# Patient Record
Sex: FEMALE | Race: BLACK OR AFRICAN AMERICAN | Employment: UNEMPLOYED | URBAN - METROPOLITAN AREA
[De-identification: names, ages, dates, MRNs, and addresses within clinical notes are randomized per-mention and may not be internally consistent; named-entity substitution may affect disease eponyms.]

---

## 2018-10-03 LAB
ANTIBODY SCREEN, EXTERNAL: NEGATIVE
CHLAMYDIA, EXTERNAL: NEGATIVE
HBSAG, EXTERNAL: NEGATIVE
HIV, EXTERNAL: NON REACTIVE
N. GONORRHEA, EXTERNAL: NEGATIVE
RPR, EXTERNAL: NON REACTIVE
TYPE, ABO & RH, EXTERNAL: NORMAL

## 2019-02-05 LAB — GRBS, EXTERNAL: NEGATIVE

## 2019-02-16 ENCOUNTER — HOSPITAL ENCOUNTER (INPATIENT)
Age: 24
LOS: 1 days | Discharge: HOME | DRG: 566 | End: 2019-02-17
Attending: OBSTETRICS & GYNECOLOGY | Admitting: OBSTETRICS & GYNECOLOGY
Payer: COMMERCIAL

## 2019-02-16 PROCEDURE — 75810000275 HC EMERGENCY DEPT VISIT NO LEVEL OF CARE

## 2019-02-17 VITALS
HEART RATE: 86 BPM | SYSTOLIC BLOOD PRESSURE: 141 MMHG | WEIGHT: 234.79 LBS | HEIGHT: 67 IN | TEMPERATURE: 98.4 F | OXYGEN SATURATION: 99 % | RESPIRATION RATE: 17 BRPM | BODY MASS INDEX: 36.85 KG/M2 | DIASTOLIC BLOOD PRESSURE: 78 MMHG

## 2019-02-17 PROBLEM — O36.4XX0 IUFD AT 20 WEEKS OR MORE OF GESTATION: Status: ACTIVE | Noted: 2019-02-17

## 2019-02-17 PROCEDURE — 65410000002 HC RM PRIVATE OB

## 2019-02-17 PROCEDURE — 99283 EMERGENCY DEPT VISIT LOW MDM: CPT

## 2019-02-17 RX ORDER — SODIUM CHLORIDE, SODIUM LACTATE, POTASSIUM CHLORIDE, CALCIUM CHLORIDE 600; 310; 30; 20 MG/100ML; MG/100ML; MG/100ML; MG/100ML
125 INJECTION, SOLUTION INTRAVENOUS CONTINUOUS
Status: DISCONTINUED | OUTPATIENT
Start: 2019-02-17 | End: 2019-02-17 | Stop reason: HOSPADM

## 2019-02-17 RX ORDER — LANOLIN ALCOHOL/MO/W.PET/CERES
CREAM (GRAM) TOPICAL
COMMUNITY
End: 2020-06-23 | Stop reason: ALTCHOICE

## 2019-02-17 RX ORDER — SODIUM CHLORIDE 0.9 % (FLUSH) 0.9 %
5-40 SYRINGE (ML) INJECTION EVERY 8 HOURS
Status: DISCONTINUED | OUTPATIENT
Start: 2019-02-17 | End: 2019-02-17 | Stop reason: HOSPADM

## 2019-02-17 RX ORDER — NALOXONE HYDROCHLORIDE 0.4 MG/ML
0.4 INJECTION, SOLUTION INTRAMUSCULAR; INTRAVENOUS; SUBCUTANEOUS AS NEEDED
Status: DISCONTINUED | OUTPATIENT
Start: 2019-02-17 | End: 2019-02-17 | Stop reason: HOSPADM

## 2019-02-17 RX ORDER — ONDANSETRON 2 MG/ML
4 INJECTION INTRAMUSCULAR; INTRAVENOUS
Status: DISCONTINUED | OUTPATIENT
Start: 2019-02-17 | End: 2019-02-17 | Stop reason: HOSPADM

## 2019-02-17 RX ORDER — SODIUM CHLORIDE 0.9 % (FLUSH) 0.9 %
5-40 SYRINGE (ML) INJECTION AS NEEDED
Status: DISCONTINUED | OUTPATIENT
Start: 2019-02-17 | End: 2019-02-17 | Stop reason: HOSPADM

## 2019-02-17 RX ORDER — NALBUPHINE HYDROCHLORIDE 10 MG/ML
10 INJECTION, SOLUTION INTRAMUSCULAR; INTRAVENOUS; SUBCUTANEOUS
Status: DISCONTINUED | OUTPATIENT
Start: 2019-02-17 | End: 2019-02-17 | Stop reason: HOSPADM

## 2019-02-17 NOTE — PROGRESS NOTES
2354: Pt. Presents to L & D for c/o decreased FM x 2 days. Per pt. She thinks she felt baby move some earlier today but it was not kicks or any movement she is used to. Pt. Is a  last delivery was  and was uncomplicated . Pt. Denies LOF, Vag bleeding, HA, N/V, Dizziness or blurred vision. Pt. Is scheduled for elective induction of labor this Thursday evening. Pt. Followed by Dr. Violet Albrecht and was last seen on . EFM attempted. Unable to obtain FHR. Second RN to bedside also attempting to locate FHR. Maternal position with continued difficulty to obtain FHR. Notified Dr. Pasquale Hidalgo and request for bedside ultrasound. 0002: Dr. Pasquale Hidalgo to bedside. Bedside Ultrasound performed. No FHR detected. IUFD confirmed. Pt. And S.o. Emotional. Emotional support provided. Tissues provided. Pt. Calling mother to tell her what has happened and ask her to come. 0004: RN's remain at bedside offering condolences and support. 0010: Stepped out of triage room and into room adjacent to provide privacy but still also be close for support. 0024: Dr. Pasquale Hidalgo back in room with patient and S.O. Talking again with patient about findings and about the loss of their baby and the plan of care. Emotional support continuing to be provided. Pt. Declines any intervention at this time and is requesting some alone time with S.O. Notified them both how to reach me and that I was just outside the door should they need me. 0040: Checked back in with patient and S.O. Both in the bed still very emotional. Asked if they had any questions or needed anything at this time. Pt. Awaiting her mothers arrival. Updated Dr. Pasquale Hidalgo. Will notify MD once patient's mother arrives and when patient is ready to discuss possible POC.     0118: Moved pt. To room 3157 for more comfort and room for when her family arrives. Offered support and asked if she was ready for me to possibly assess her vital signs or perhaps start her IV and she declined. Instructed pt. Again how to use the call bell if she needed me and will check back in shortly. 0215: Pt. Kaleb Rosales in bed.  at bedside with patient. 's mother and father arrived. 0240: Additional family arrived. In to speak with family with patient's consent. Pt. And family asking about possibly going home vs. Induction of labor. Discussed that Dr. Shantal Jackson would need to come assess her cervix and decide on POC. Discussing things as a family. Emotional support provided. Will check back in.     0330: Pt. Desires to go home. She wants to speak with Dr. Galo Curry and plan for delivery after speaking with him. VS obtained. Called and spoke with Dr. Shantal Jackson. Pt. To be discharged home and POC to be deferred to primary MD. Pt. Afebrile. Family and patient all agreeable with this plan. 5975: Discharge instructions explained and given. S/S's to report explained to both patient and family members. Emotional support provided again. Phone number to contact L & D given as well as two phone numbers obtained from patient for Dr. Galo Curry to reach out later today or myself once I have spoken with Dr. Galo Curry. 0400: Walked with pt. And S.O. Off of unit to elevator. Pt. Ambulating without difficulty. All belongings removed from room and with patient. Pt. Very appreciative of the care received. Emotional support provided once again.

## 2019-02-17 NOTE — DISCHARGE INSTRUCTIONS
Patient Education        Stillbirth (Before Delivery): Care Instructions  Your Care Instructions    Stillbirth is the loss of a baby after 20 weeks of pregnancy. When a baby dies while still in the womb, this may also be called fetal loss. A doctor may deliver the baby by giving you medicine to start labor. Or you may have a surgical procedure called D&E (dilation and evacuation). The loss of a baby is devastating and very hard to accept. You may wonder why it happened or blame yourself. But fetal loss can happen even during a pregnancy that has been going well. In the weeks to come, try to take care of yourself physically and emotionally. Take care of yourself in whatever way feels best.  Follow-up care is a key part of your treatment and safety. Be sure to make and go to all appointments, and call your doctor if you are having problems. It's also a good idea to know your test results and keep a list of the medicines you take. What happens next? After a fetus dies, labor will usually begin on its own within 2 weeks. Many women don't want to wait that long. They choose to have labor induced. This means going to the hospital and, usually, getting medicine that starts the labor process. If labor doesn't start on its own, your doctor may take steps to get your labor going. · Your doctor may use medicine to soften your cervix and help it begin to open. · Then your doctor probably will give you medicine to start labor and keep your labor going. · You will be given medicine for pain if you need it. After delivery, you will probably be able to see the baby if you want to. Although this can be very hard, some parents want the chance to hold the baby and say goodbye. You will probably go home the next day. Surgery instead of labor  Some women may be able to choose surgery (D&E) instead of going through labor. Your doctor will discuss whether this is an option for you.   Delivery by  section is rare in fetal loss. It is major surgery, so it's only done when going through labor would be more dangerous. Deciding about autopsy  If the exact cause of death isn't known, you may face a decision about whether to have an autopsy. This can be a hard decision. But an autopsy may help you find out why this terrible loss happened to you and whether it could happen again. How can you care for yourself at home? After the delivery, there are things you can do for your physical health and comfort. Taking care of your body  · Use pads instead of tampons for the bloody flow that may last as long as 2 weeks. · Ask your doctor if you can take an over-the-counter pain medicine, such as acetaminophen (Tylenol), ibuprofen (Advil, Motrin), or naproxen (Aleve), to ease cramps. Be safe with medicines. Read and follow all instructions on the label. · Ask your doctor about when it is okay to have sex. Many doctors recommend waiting about 4 to 6 weeks. This gives your body time to heal.  · If your milk has started to come in, talk to your doctor about how to ease discomfort. Dealing with your grief  · Rest whenever you can. Being tired makes it harder to handle your emotions. · Tell your family and friends what they can do. You may want to spend time alone, or you may seek the comfort of family and friends. · Try to eat healthy foods, get some sleep, and get exercise (or just get out of the house) to help you feel strong as you heal.  · Talk to your doctor about how you are coping. He or she will want to watch you for signs of depression. You may want to have counseling for support and to help you express your feelings. · Think about making a memory book of your pregnancy and baby. Many parents name their baby and want to take pictures and keep a lock of hair. The hospital may take photos or footprints for you. Some parents have a ceremony, such as a christening or other blessing or a  service.   · If you can, try to talk to others who have gone through this loss. You can make connections online or in person. Here are some organizations that can help:  ? The [employer]. This is a resource for people who have lost a child. The group can help put you in touch with one of its support groups in your area. The website is www.compassionatefriends. org.  ? Share (Pregnancy and Infant Loss [address] [place] [address].). This group can offer advice and connections to others who have lost a child. The group's website is www.[facility]ient [facility].org.  ? The [employer] [facility] [employer]. This group offers information and resources. The website is www.stillbirthalliance.org. When should you call for help? Call 911 anytime you think you may need emergency care. For example, call if:    · You passed out (lost consciousness).     · You have severe vaginal bleeding.     · You have severe pain in your belly or pelvis.    Call your doctor now or seek immediate medical care if:    · You have signs of preeclampsia, such as:  ? Sudden swelling of your face, hands, or feet. ? New vision problems (such as dimness or blurring). ? A severe headache.     · You have a fever.     · You have belly pain or cramping.     · You have any vaginal bleeding.     · You have had regular contractions (with or without pain) for an hour. This means that you have 8 or more within 1 hour or 4 or more in 20 minutes after you change your position and drink fluids.     · You have a sudden release of fluid from your vagina.     · You have low back pain or pelvic pressure that does not go away.    Watch closely for changes in your health, and be sure to contact your doctor if you have any problems. Where can you learn more? Go to http://isak-joe.info/. Enter Q720 in the search box to learn more about \"Stillbirth (Before Delivery): Care Instructions. \"  Current as of: September 5, 2018  Content Version: 11.9  © 6402-0402 [employer], [facility].  Care instructions adapted under license by [employer] (which disclaims liability or warranty for this information). If you have questions about a medical condition or this instruction, always ask your healthcare professional. [place] [address] any warranty or liability for your use of this information. RN to notify Dr. Josette Lees later this morning 2/17/19. Will discuss with him plan of care and delivery plan. Expect a phone call from him and a plan of care.

## 2019-02-17 NOTE — H&P
History & Physical    Name: Izzy Toure MRN: [id number]  SSN: [id number]    YOB: 1995  Age: 24 y.o. Sex: female        Subjective:     Estimated Date of Delivery: 19  OB History    Para Term  AB Living   2 1 1     1   SAB TAB Ectopic Molar Multiple Live Births             1      # Outcome Date GA Lbr Corey/2nd Weight Sex Delivery Anes PTL Lv   2 Current            1 Term 11 39w3d 09:36 / 01:06 3.19 kg Thomas Boston is admitted with pregnancy at 38w4d for IUFD. Pt with decreased FM since yesterday. Prenatal course was normal. Please see prenatal records for details. No past medical history on file. No past surgical history on file. Social History     Occupational History    Not on file   Tobacco Use    Smoking status: Not on file   Substance and Sexual Activity    Alcohol use: No    Drug use: Not on file    Sexual activity: Yes     Partners: Male     Birth control/protection: None     Family History   Problem Relation Age of Onset    Alcohol abuse Maternal Grandmother      No Known Allergies  Prior to Admission medications    Medication Sig Start Date End Date Taking? Authorizing Provider   oxyCODONE-acetaminophen (PERCOCET) 5-325 mg per tablet Take 1 Tab by mouth every four (4) hours as needed (May repeat dose up to one hour after current dose if no relief. Do not exceed 2 doses every 3 hours. ). 11   MD Elliot Salas MV-Iron-[id number]-TL-[id number] -1 mg Cmpk Take  by mouth. Provider, Historical        Review of Systems   All other systems reviewed and are negative. Objective:     Vitals:  Vitals:    19 2343   BP: 153/71   Pulse: 97   Resp: 18   SpO2: 99%   Weight: 106.5 kg (234 lb 12.6 oz)   Height: 5' 7\" (1.702 m)        Physical Exam   Constitutional: She is oriented to person, place, and time. She appears well-developed and well-nourished. No distress.    HENT:   Head: Normocephalic and atraumatic. Eyes: EOM are normal. No scleral icterus. Neck: Normal range of motion. Neck supple. No JVD present. No tracheal deviation present. No thyromegaly present. Cardiovascular: Normal rate, regular rhythm and normal heart sounds. Exam reveals no gallop and no friction rub. No murmur heard. Pulmonary/Chest: Effort normal and breath sounds normal. No respiratory distress. She has no wheezes. She has no rales. Abdominal: Soft. Bowel sounds are normal. She exhibits no distension and no mass. There is no tenderness. There is no rebound and no guarding. Musculoskeletal: Normal range of motion. She exhibits no edema, tenderness or deformity. Lymphadenopathy:     She has no cervical adenopathy. Neurological: She is alert and oriented to person, place, and time. She has normal reflexes. She displays normal reflexes. She exhibits normal muscle tone. Skin: Skin is warm and dry. No rash noted. She is not diaphoretic. No erythema. No pallor. Psychiatric: She has a normal mood and affect. Her behavior is normal. Judgment and thought content normal.       Uterine Activity: None  Membranes: Intact  Fetal Heart Rate: abscent on U/S    Prenatal Labs:   Lab Results   Component Value Date/Time    Rubella, External non imm 07/06/2011    GrBStrep, External neg 07/06/2011    HIV, External neg 07/06/2011    RPR, External non reactive 07/06/2011    Gonorrhea, External neg 07/06/2011    Chlamydia, External neg 07/06/2011    ABO,Rh A pos 07/06/2011          Impression/Plan:     1)IUFD at 38 wks     Plan: Admit for IOL. pt deferring cervical exam at this point as she is still processing this information. Questions answered and support given.  Plan per result of cervical exam.    Signed By:  Colin Hook MD     February 17, 2019

## 2019-02-18 ENCOUNTER — ANESTHESIA EVENT (OUTPATIENT)
Dept: LABOR AND DELIVERY | Age: 24
DRG: 560 | End: 2019-02-18
Payer: COMMERCIAL

## 2019-02-18 ENCOUNTER — HOSPITAL ENCOUNTER (INPATIENT)
Age: 24
LOS: 1 days | Discharge: HOME | DRG: 560 | End: 2019-02-19
Attending: SPECIALIST | Admitting: SPECIALIST
Payer: COMMERCIAL

## 2019-02-18 ENCOUNTER — ANESTHESIA (OUTPATIENT)
Dept: LABOR AND DELIVERY | Age: 24
DRG: 560 | End: 2019-02-18
Payer: COMMERCIAL

## 2019-02-18 LAB
ALBUMIN SERPL-MCNC: 2.6 G/DL (ref 3.5–5)
ALBUMIN/GLOB SERPL: 0.5 {RATIO} (ref 1.1–2.2)
ALP SERPL-CCNC: 161 U/L (ref 45–117)
ALT SERPL-CCNC: 17 U/L (ref 12–78)
ANION GAP SERPL CALC-SCNC: 10 MMOL/L (ref 5–15)
AST SERPL-CCNC: 15 U/L (ref 15–37)
BASOPHILS # BLD: 0 K/UL (ref 0–0.1)
BASOPHILS NFR BLD: 0 % (ref 0–1)
BILIRUB SERPL-MCNC: 0.6 MG/DL (ref 0.2–1)
BUN SERPL-MCNC: 9 MG/DL (ref 6–20)
BUN/CREAT SERPL: 15 (ref 12–20)
CALCIUM SERPL-MCNC: 9.2 MG/DL (ref 8.5–10.1)
CHLORIDE SERPL-SCNC: 107 MMOL/L (ref 97–108)
CO2 SERPL-SCNC: 21 MMOL/L (ref 21–32)
CREAT SERPL-MCNC: 0.59 MG/DL (ref 0.55–1.02)
DIFFERENTIAL METHOD BLD: ABNORMAL
EOSINOPHIL # BLD: 0.1 K/UL (ref 0–0.4)
EOSINOPHIL NFR BLD: 2 % (ref 0–7)
ERYTHROCYTE [DISTWIDTH] IN BLOOD BY AUTOMATED COUNT: 13.2 % (ref 11.5–14.5)
GLOBULIN SER CALC-MCNC: 4.8 G/DL (ref 2–4)
GLUCOSE SERPL-MCNC: 87 MG/DL (ref 65–100)
HCT VFR BLD AUTO: 36.4 % (ref 35–47)
HGB BLD-MCNC: 11.7 G/DL (ref 11.5–16)
IMM GRANULOCYTES # BLD AUTO: 0.1 K/UL (ref 0–0.04)
IMM GRANULOCYTES NFR BLD AUTO: 1 % (ref 0–0.5)
LDH SERPL L TO P-CCNC: 173 U/L (ref 81–246)
LYMPHOCYTES # BLD: 2.1 K/UL (ref 0.8–3.5)
LYMPHOCYTES NFR BLD: 28 % (ref 12–49)
MCH RBC QN AUTO: 26.8 PG (ref 26–34)
MCHC RBC AUTO-ENTMCNC: 32.1 G/DL (ref 30–36.5)
MCV RBC AUTO: 83.3 FL (ref 80–99)
MONOCYTES # BLD: 0.8 K/UL (ref 0–1)
MONOCYTES NFR BLD: 10 % (ref 5–13)
NEUTS SEG # BLD: 4.3 K/UL (ref 1.8–8)
NEUTS SEG NFR BLD: 59 % (ref 32–75)
NRBC # BLD: 0 K/UL (ref 0–0.01)
NRBC BLD-RTO: 0 PER 100 WBC
PLATELET # BLD AUTO: 278 K/UL (ref 150–400)
PMV BLD AUTO: 11 FL (ref 8.9–12.9)
POTASSIUM SERPL-SCNC: 3.8 MMOL/L (ref 3.5–5.1)
PROT SERPL-MCNC: 7.4 G/DL (ref 6.4–8.2)
RBC # BLD AUTO: 4.37 M/UL (ref 3.8–5.2)
SODIUM SERPL-SCNC: 138 MMOL/L (ref 136–145)
URATE SERPL-MCNC: 5.3 MG/DL (ref 2.6–6)
WBC # BLD AUTO: 7.4 K/UL (ref 3.6–11)

## 2019-02-18 PROCEDURE — 83615 LACTATE (LD) (LDH) ENZYME: CPT

## 2019-02-18 PROCEDURE — 74011250636 HC RX REV CODE- 250/636

## 2019-02-18 PROCEDURE — 3E0R3BZ INTRODUCTION OF ANESTHETIC AGENT INTO SPINAL CANAL, PERCUTANEOUS APPROACH: ICD-10-PCS | Performed by: ANESTHESIOLOGY

## 2019-02-18 PROCEDURE — 74011250637 HC RX REV CODE- 250/637: Performed by: SPECIALIST

## 2019-02-18 PROCEDURE — 74011000250 HC RX REV CODE- 250: Performed by: ANESTHESIOLOGY

## 2019-02-18 PROCEDURE — 74011000250 HC RX REV CODE- 250

## 2019-02-18 PROCEDURE — A4300 CATH IMPL VASC ACCESS PORTAL: HCPCS

## 2019-02-18 PROCEDURE — 77030014125 HC TY EPDRL BBMI -B: Performed by: ANESTHESIOLOGY

## 2019-02-18 PROCEDURE — 65410000002 HC RM PRIVATE OB

## 2019-02-18 PROCEDURE — 75410000003 HC RECOV DEL/VAG/CSECN EA 0.5 HR

## 2019-02-18 PROCEDURE — 75410000002 HC LABOR FEE PER 1 HR

## 2019-02-18 PROCEDURE — 00HU33Z INSERTION OF INFUSION DEVICE INTO SPINAL CANAL, PERCUTANEOUS APPROACH: ICD-10-PCS | Performed by: ANESTHESIOLOGY

## 2019-02-18 PROCEDURE — 84550 ASSAY OF BLOOD/URIC ACID: CPT

## 2019-02-18 PROCEDURE — 36415 COLL VENOUS BLD VENIPUNCTURE: CPT

## 2019-02-18 PROCEDURE — 76060000078 HC EPIDURAL ANESTHESIA

## 2019-02-18 PROCEDURE — 88307 TISSUE EXAM BY PATHOLOGIST: CPT

## 2019-02-18 PROCEDURE — 74011250636 HC RX REV CODE- 250/636: Performed by: SPECIALIST

## 2019-02-18 PROCEDURE — 85025 COMPLETE CBC W/AUTO DIFF WBC: CPT

## 2019-02-18 PROCEDURE — 75410000000 HC DELIVERY VAGINAL/SINGLE

## 2019-02-18 PROCEDURE — 80053 COMPREHEN METABOLIC PANEL: CPT

## 2019-02-18 PROCEDURE — 3E033VJ INTRODUCTION OF OTHER HORMONE INTO PERIPHERAL VEIN, PERCUTANEOUS APPROACH: ICD-10-PCS | Performed by: SPECIALIST

## 2019-02-18 RX ORDER — OXYCODONE AND ACETAMINOPHEN 5; 325 MG/1; MG/1
1 TABLET ORAL
Status: DISCONTINUED | OUTPATIENT
Start: 2019-02-18 | End: 2019-02-19 | Stop reason: HOSPADM

## 2019-02-18 RX ORDER — OXYTOCIN/0.9 % SODIUM CHLORIDE 30/500 ML
0-25 PLASTIC BAG, INJECTION (ML) INTRAVENOUS
Status: DISCONTINUED | OUTPATIENT
Start: 2019-02-18 | End: 2019-02-19 | Stop reason: HOSPADM

## 2019-02-18 RX ORDER — SIMETHICONE 80 MG
80 TABLET,CHEWABLE ORAL
Status: DISCONTINUED | OUTPATIENT
Start: 2019-02-18 | End: 2019-02-19 | Stop reason: HOSPADM

## 2019-02-18 RX ORDER — BUPIVACAINE HYDROCHLORIDE AND EPINEPHRINE 2.5; 5 MG/ML; UG/ML
INJECTION, SOLUTION EPIDURAL; INFILTRATION; INTRACAUDAL; PERINEURAL AS NEEDED
Status: DISCONTINUED | OUTPATIENT
Start: 2019-02-18 | End: 2019-02-18 | Stop reason: HOSPADM

## 2019-02-18 RX ORDER — HYDROCORTISONE ACETATE PRAMOXINE HCL 2.5; 1 G/100G; G/100G
CREAM TOPICAL AS NEEDED
Status: DISCONTINUED | OUTPATIENT
Start: 2019-02-18 | End: 2019-02-19 | Stop reason: HOSPADM

## 2019-02-18 RX ORDER — OXYTOCIN/RINGER'S LACTATE 20/1000 ML
125-500 PLASTIC BAG, INJECTION (ML) INTRAVENOUS ONCE
Status: COMPLETED | OUTPATIENT
Start: 2019-02-18 | End: 2019-02-18

## 2019-02-18 RX ORDER — NALOXONE HYDROCHLORIDE 0.4 MG/ML
0.4 INJECTION, SOLUTION INTRAMUSCULAR; INTRAVENOUS; SUBCUTANEOUS AS NEEDED
Status: DISCONTINUED | OUTPATIENT
Start: 2019-02-18 | End: 2019-02-19 | Stop reason: HOSPADM

## 2019-02-18 RX ORDER — FENTANYL/BUPIVACAINE/NS/PF 2-1250MCG
PREFILLED PUMP RESERVOIR EPIDURAL
Status: COMPLETED
Start: 2019-02-18 | End: 2019-02-18

## 2019-02-18 RX ORDER — FACIAL-BODY WIPES
10 EACH TOPICAL DAILY PRN
Status: DISCONTINUED | OUTPATIENT
Start: 2019-02-18 | End: 2019-02-19 | Stop reason: HOSPADM

## 2019-02-18 RX ORDER — IBUPROFEN 400 MG/1
800 TABLET ORAL EVERY 8 HOURS
Status: DISCONTINUED | OUTPATIENT
Start: 2019-02-18 | End: 2019-02-19 | Stop reason: HOSPADM

## 2019-02-18 RX ORDER — FENTANYL/BUPIVACAINE/NS/PF 2-1250MCG
1-16 PREFILLED PUMP RESERVOIR EPIDURAL CONTINUOUS
Status: DISCONTINUED | OUTPATIENT
Start: 2019-02-18 | End: 2019-02-19 | Stop reason: HOSPADM

## 2019-02-18 RX ORDER — ONDANSETRON 4 MG/1
4 TABLET, ORALLY DISINTEGRATING ORAL
Status: DISCONTINUED | OUTPATIENT
Start: 2019-02-18 | End: 2019-02-19 | Stop reason: HOSPADM

## 2019-02-18 RX ORDER — OXYTOCIN/RINGER'S LACTATE 20/1000 ML
PLASTIC BAG, INJECTION (ML) INTRAVENOUS
Status: COMPLETED
Start: 2019-02-18 | End: 2019-02-18

## 2019-02-18 RX ORDER — DIPHENHYDRAMINE HCL 25 MG
25 CAPSULE ORAL
Status: DISCONTINUED | OUTPATIENT
Start: 2019-02-18 | End: 2019-02-19 | Stop reason: HOSPADM

## 2019-02-18 RX ORDER — SODIUM CHLORIDE, SODIUM LACTATE, POTASSIUM CHLORIDE, CALCIUM CHLORIDE 600; 310; 30; 20 MG/100ML; MG/100ML; MG/100ML; MG/100ML
125 INJECTION, SOLUTION INTRAVENOUS CONTINUOUS
Status: DISCONTINUED | OUTPATIENT
Start: 2019-02-18 | End: 2019-02-19 | Stop reason: HOSPADM

## 2019-02-18 RX ORDER — ACETAMINOPHEN 325 MG/1
650 TABLET ORAL
Status: DISCONTINUED | OUTPATIENT
Start: 2019-02-18 | End: 2019-02-19 | Stop reason: HOSPADM

## 2019-02-18 RX ORDER — BUPIVACAINE HYDROCHLORIDE AND EPINEPHRINE 2.5; 5 MG/ML; UG/ML
INJECTION, SOLUTION EPIDURAL; INFILTRATION; INTRACAUDAL; PERINEURAL
Status: COMPLETED
Start: 2019-02-18 | End: 2019-02-18

## 2019-02-18 RX ADMIN — Medication 12 ML/HR: at 10:53

## 2019-02-18 RX ADMIN — Medication 1 MILLI-UNITS/MIN: at 08:20

## 2019-02-18 RX ADMIN — SODIUM CHLORIDE, SODIUM LACTATE, POTASSIUM CHLORIDE, AND CALCIUM CHLORIDE 125 ML/HR: 600; 310; 30; 20 INJECTION, SOLUTION INTRAVENOUS at 19:28

## 2019-02-18 RX ADMIN — SODIUM CHLORIDE, SODIUM LACTATE, POTASSIUM CHLORIDE, AND CALCIUM CHLORIDE 125 ML/HR: 600; 310; 30; 20 INJECTION, SOLUTION INTRAVENOUS at 10:29

## 2019-02-18 RX ADMIN — SODIUM CHLORIDE, SODIUM LACTATE, POTASSIUM CHLORIDE, AND CALCIUM CHLORIDE 125 ML/HR: 600; 310; 30; 20 INJECTION, SOLUTION INTRAVENOUS at 08:00

## 2019-02-18 RX ADMIN — Medication 12 ML/HR: at 18:18

## 2019-02-18 RX ADMIN — BUPIVACAINE HYDROCHLORIDE AND EPINEPHRINE 0.4 ML: 2.5; 5 INJECTION, SOLUTION EPIDURAL; INFILTRATION; INTRACAUDAL; PERINEURAL at 10:45

## 2019-02-18 RX ADMIN — IBUPROFEN 800 MG: 400 TABLET ORAL at 22:10

## 2019-02-18 RX ADMIN — BUPIVACAINE HYDROCHLORIDE AND EPINEPHRINE 6 ML: 2.5; 5 INJECTION, SOLUTION EPIDURAL; INFILTRATION; INTRACAUDAL; PERINEURAL at 10:46

## 2019-02-18 RX ADMIN — Medication 10000 MILLI-UNITS/HR: at 19:58

## 2019-02-18 NOTE — ANESTHESIA PROCEDURE NOTES
CSE Block    Performed by: Chantelle Pedraza MD  Authorized by: Chantelle Pedraza MD     Pre-Procedure  preanesthetic checklist: risks and benefits discussed, site marked and timeout performed      Procedure:   Patient Position:  Seated  Prep Region:  Lumbar  Prep: DuraPrep    Location:  L3-4    Epidural Needle:   Needle Type:  Tuohy  Needle Gauge:  17 G  Injection Technique:  Loss of resistance using air  Attempts:  1    Spinal Needle:   Needle Type: Dede  Needle Gauge:  25 G    Catheter:   Catheter Type:  Flex-tip  Catheter Size:  19 G  Events: no blood with aspiration, no paresthesia, negative aspiration test and CSF confirmed    Test Dose:  Bupivacaine 0.25% w/ epi and negative    Assessment:   Catheter Secured:  Tegaderm and tape  Insertion:  Uncomplicated  Patient tolerance:  Patient tolerated the procedure well with no immediate complications  Hands washed and mask worn during procedure. Several attempts were made at epidural placement at L2-L3, all were unsuccessful. Successful placement at L3-L4. Spinal portion:    A 25 g pencil point spinal needle was placed through the Touhy x1 attempt until CSF was obtained. 0.4 mL 0.25% bupivacaine with 1:200K epinephrine was deposited into the CSF. -paresthesia.

## 2019-02-18 NOTE — PROGRESS NOTES
Spiritual Care Assessment/Progress Note  [facility]      NAME: Wayne Eugene      MRN: [id number]  AGE: 24 y.o.  SEX: female  Adventist Affiliation: No Holiness   Language: English     2019     Total Time (in minutes): 15     Spiritual Assessment begun in [facility] LABOR [facility] DELIVERY through conversation with:         [x]Patient        [x] Family    [] Friend(s)        Reason for Consult: Death,  loss     Spiritual beliefs: (Please include comment if needed)     [x] Identifies with a royal tradition:         [] Supported by a royal community:            [] Claims no spiritual orientation:           [] Seeking spiritual identity:                [] Adheres to an individual form of spirituality:           [] Not able to assess:                           Identified resources for coping:      [x] Prayer                               [] Music                  [] Guided Imagery     [x] Family/friends                 [] Pet visits     [] Devotional reading                         [] Unknown     [] Other:                                              Interventions offered during this visit: (See comments for more details)    Patient Interventions: Prayer (actual), Prayer (assurance of), Crisis, Affirmation of emotions/emotional suffering,  loss     Family/Friend(s):  loss, Prayer (actual), Prayer (assurance of), Affirmation of emotions/emotional suffering     Plan of Care:     [x] Support spiritual and/or cultural needs    [] Support AMD and/or advance care planning process      [x] Support grieving process   [] Coordinate Rites and/or Rituals    [] Coordination with community clergy   [] No spiritual needs identified at this time   [] Detailed Plan of Care below (See Comments)  [] Make referral to Music Therapy  [] Make referral to Pet Therapy     [] Make referral to Addiction services  [] Make referral to [facility]  [] Make referral to Spiritual Care Partner  [] No future visits requested        [x] Follow up visits as needed     Comments:  was called to the L&D to visit with the patient. The patient has had a  loss.  visited with patient,  and mother in law in the room. Patient asked for prayer and  prayed at the bedside with the family.  provided pastoral care and support to the family.  also helped the family with the greiving process.  consulter with nurse and asked to be called back back to patients room after the procedure. Spiritual Care will follow up with patient and family.     Caleb Duran MDiv  Pager: [phone]

## 2019-02-18 NOTE — H&P
History & Physical    Name: Sal Napier MRN: [id number]  SSN: [id number]    YOB: 1995  Age: 24 y.o. Sex: female      Subjective:     Estimated Date of Delivery: 19  OB History    Para Term  AB Living   2 1 1     1   SAB TAB Ectopic Molar Multiple Live Births             1      # Outcome Date GA Lbr Corey/2nd Weight Sex Delivery Anes PTL Lv   2 Current            1 Term 11 39w3d 09:36 / 01:06 3.19 kg Luisla Alma Delia          Ms. Flavia Campbell is admitted with pregnancy at 38w5d for induction of labor due to IUFD. Prenatal course was normal.  Please see prenatal records for details. Past Medical History:   Diagnosis Date    Anemia     Chlamydia     previously per prenatal. Negative 10/13/18     No past surgical history on file. Social History     Occupational History    Not on file   Tobacco Use    Smoking status: Never Smoker    Smokeless tobacco: Never Used   Substance and Sexual Activity    Alcohol use: No    Drug use: No    Sexual activity: Yes     Partners: Male     Birth control/protection: None     Family History   Problem Relation Age of Onset    Alcohol abuse Maternal Grandmother        No Known Allergies  Prior to Admission medications    Medication Sig Start Date End Date Taking? Authorizing Provider   ferrous sulfate (IRON) 325 mg (65 mg iron) tablet Take  by mouth Daily (before breakfast). Provider, Historical   Prenat MV-Iron-Ca-WO-Pu2-NixLN -1 mg Cmpk Take  by mouth. Provider, Historical        Review of Systems: A comprehensive review of systems was negative except for that written in the History of Present Illness.     Objective:     Vitals:  Vitals:    19 0642 19 0728   BP: 148/70    Pulse: (!) 121    Resp: 18    Temp: 98.3 °F (36.8 °C)    SpO2: 97%    Weight:  106.1 kg (234 lb)   Height:  5' 7\" (1.702 m)        Physical Exam:  Cervical Exam: 2 cm dilated    50% effaced    -2 station    Presenting Part: cephalic  Cervical Position: mid position  Consistency: Medium  Membranes:  Intact          Prenatal Labs:   Lab Results   Component Value Date/Time    Rubella, External non imm 07/06/2011    HBsAg, External Negative 10/03/2018    HIV, External Non Reactive 10/03/2018    RPR, External Non Reactive 10/03/2018    Gonorrhea, External Negative 10/03/2018    Chlamydia, External Negative 10/03/2018    ABO,Rh A Pos. 10/03/2018    GrBStrep, External Negative 02/05/2019       Impression/Plan:     Active Problems:    * No active hospital problems. *       Plan: Admit for induction of labor.     Signed By:  Kelsey Carrillo MD     February 18, 2019

## 2019-02-18 NOTE — ANESTHESIA PREPROCEDURE EVALUATION
Anesthetic History   No history of anesthetic complications            Review of Systems / Medical History  Patient summary reviewed, nursing notes reviewed and pertinent labs reviewed    Pulmonary  Within defined limits                 Neuro/Psych   Within defined limits           Cardiovascular                  Exercise tolerance: >4 METS     GI/Hepatic/Renal  Within defined limits              Endo/Other        Obesity     Other Findings   Comments: Fetal demise  Hx Chlamydia           Physical Exam    Airway  Mallampati: III    Neck ROM: normal range of motion   Mouth opening: Normal     Cardiovascular  Regular rate and rhythm,  S1 and S2 normal,  no murmur, click, rub, or gallop             Dental         Pulmonary  Breath sounds clear to auscultation               Abdominal  GI exam deferred       Other Findings            Anesthetic Plan    ASA: 2  Anesthesia type: CSE            Anesthetic plan and risks discussed with: Patient

## 2019-02-18 NOTE — PROGRESS NOTES
0715:  Bedside shift change report given to Ren Shepard (oncoming nurse) by VIKTORIA Velazco, RN (offgoing nurse). Report included the following information SBAR.     0900:  Dr. Josette Lees at bedside, SVE done, pt 2cm. Unable to rupture at this time due to pt comfort level. Pt will get epidural first.    1006: Pt up to the bathroom. 1028: Dr. Heladio Burleson at bedside for epidural placement. 1230:  Contacted Chaplain Rao to pray with pt per her request.    [phone]:  Raciel Franklin at bedside to pray with patient and family. 1713:  Dr. Josette Lees at bedside, SVE done, pt 10cm dilated but still high. Orders received to labor down and place in Trendelenburg. 1924: Bedside shift change report given to DOUG Nunez (oncoming nurse) by Shelby Damon, APRIL (offgoing nurse). Report included the following information SBAR.

## 2019-02-19 VITALS
BODY MASS INDEX: 36.73 KG/M2 | WEIGHT: 234 LBS | OXYGEN SATURATION: 99 % | HEIGHT: 67 IN | SYSTOLIC BLOOD PRESSURE: 130 MMHG | RESPIRATION RATE: 18 BRPM | HEART RATE: 75 BPM | DIASTOLIC BLOOD PRESSURE: 60 MMHG | TEMPERATURE: 98.1 F

## 2019-02-19 PROCEDURE — 90707 MMR VACCINE SC: CPT | Performed by: SPECIALIST

## 2019-02-19 PROCEDURE — 77030018749 HC HK AMNIO DISP DERY -A

## 2019-02-19 PROCEDURE — 74011250636 HC RX REV CODE- 250/636: Performed by: SPECIALIST

## 2019-02-19 PROCEDURE — 74011250637 HC RX REV CODE- 250/637: Performed by: SPECIALIST

## 2019-02-19 RX ADMIN — IBUPROFEN 800 MG: 400 TABLET ORAL at 07:04

## 2019-02-19 RX ADMIN — MEASLES, MUMPS, AND RUBELLA VIRUS VACCINE LIVE 0.5 ML: 1000; 12500; 1000 INJECTION, POWDER, LYOPHILIZED, FOR SUSPENSION SUBCUTANEOUS at 13:03

## 2019-02-19 NOTE — DISCHARGE INSTRUCTIONS
Patient Education        Stillbirth (After Vaginal Delivery): Care Instructions  Overview    Stillbirth is the loss of a baby after 20 weeks of pregnancy. It can happen during the pregnancy. Or it can happen during labor. The loss of a baby is devastating and very hard to accept. You may wonder why it happened or blame yourself. But a stillbirth can happen even in a pregnancy that has been going well. In the weeks to come, try to take care of your physical and emotional needs. Take care of yourself in whatever way feels best.  Trying to get pregnant again  Talk to your doctor if you want to know when you can try to get pregnant again. It depends on how quickly your body heals. It also depends on what was done to help deliver the baby. And you may want to make sure that you and your family are emotionally ready to try again to get pregnant. Follow-up care is a key part of your treatment and safety. Be sure to make and go to all appointments, and call your doctor if you are having problems. It's also a good idea to know your test results and keep a list of the medicines you take. How can you care for yourself at home? Taking care of your body  · Use pads instead of tampons for the bloody flow that may last as long as 2 weeks. · Ask your doctor if you can take an over-the-counter pain medicine, such as acetaminophen (Tylenol), ibuprofen (Advil, Motrin), or naproxen (Aleve), to ease cramps. Be safe with medicines. Read and follow all instructions on the label. · Ease soreness of hemorrhoids and the area between your vagina and rectum with ice compresses or witch hazel pads. · Ease constipation by drinking lots of fluid and eating high-fiber foods. Ask your doctor about over-the-counter stool softeners. · Cleanse yourself with a gentle squeeze of warm water from a bottle instead of wiping with toilet paper. · Take a sitz bath in warm water several times a day.   · Talk to your doctor about how to ease discomfort from your milk coming in. · Ask your doctor about when it is okay to have sex. Many doctors recommend waiting about 4 to 6 weeks. This gives your body time to heal.  Dealing with your grief  · Rest whenever you can. Being tired makes it harder to handle your emotions. · Tell your family and friends what they can do. You may want to spend time alone, or you may seek the comfort of family and friends. · Try to eat healthy foods, get some sleep, and get exercise (or just get out of the house) to help you feel strong as you heal.  · Talk to your doctor about how you are coping. He or she will want to watch you for signs of depression. You may want to have counseling for support and to help you express your feelings. · Think about making a memory book of your pregnancy and baby. Many parents name their baby and want to take pictures and keep a lock of hair. The hospital may take photos or footprints for you. Some parents have a ceremony, such as a christening or other blessing or a  service. · If you can, try to talk to others who have gone through this loss. You can make connections online or in person. Here are some organizations that can help:  ? The Compassionate Friends: Go to www.compassionatefriends. org for this resource for people who have lost a child. The group can help put you in touch with one of its support groups in your area. ? Share (Pregnancy and Infant Loss [address] [place] [address].): This group at www.nationalshare. org can offer advice and connections to others who have lost a child. ? The International Stillbirth [place]: This group at www.stillbirthalliance. org offers information and resources. When should you call for help? [id number] anytime you think you may need emergency care.  For example, call if:    · You passed out (lost consciousness).     Call your doctor now or seek immediate medical care if:    · You have severe vaginal bleeding.     · You have a fever.     · You have new or more pain in your belly or pelvis.     · You are dizzy or lightheaded, or you feel like you may faint.    Watch closely for changes in your health, and be sure to contact your doctor if:    · Your vaginal bleeding seems to be getting heavier.     · You have new or worse vaginal discharge.     · You feel so sad, anxious, or hopeless that you aren't able to manage daily activities.     · You feel like hurting yourself or someone else.     · You do not get better as expected. Where can you learn more? Go to http://isak-joe.info/. Enter V732 in the search box to learn more about \"Stillbirth (After Vaginal Delivery): Care Instructions. \"  Current as of: September 5, 2018  Content Version: 11.9  © 7183-4420 [employer]. Care instructions adapted under license by [employer] (which disclaims liability or warranty for this information). If you have questions about a medical condition or this instruction, always ask your healthcare professional. [place] [address] any warranty or liability for your use of this information.

## 2019-02-19 NOTE — PROGRESS NOTES
Responded to page from L&D nursing staff to support family and offer a blessing and commendation for baby Pk Parikh. Met with patient and family members at bedside and offered words of comfort as they held the infant and calmly expressed their grief. Offered words of encouragement and a prayer of blessing and commendation for the infant. Offered to print out a certificate of blessing and commendation, which was handed to the parents. Advised them of  availability if and when needed. Rev.  Kajal Brandt   Paging Service: [phone])

## 2019-02-19 NOTE — PROGRESS NOTES
Patient resting  VS: Stable  Exam: WNL  Plan: D/C home  F/U 1 week  All Q's ans wered and verbalized understanding

## 2019-02-19 NOTE — PROGRESS NOTES
0715-Bedside report from Ernst, care assumed at this time. 0720-Christiane Weaver in to see patient, POC discussed, orders received to discharge patient home today when she is ready. 0725-Assessment done, patient states no new complaints. Whiteboard updated at this time. All questions answered. 0945-Keren Baby in room to take pictures. 1250-Patient called out, states she is ready to go home. Will administer vaccines and review discharge instructions. 1300-Discharge instructions reviewed and signed by patient, patient given copy of instructions. Patient requesting time with infant, patient told to call RN when she is ready. 1400-Patient off unit with significant other with no signs of distress.

## 2019-02-19 NOTE — PROGRESS NOTES
2016:  Report received from Felicia Donald RN at 4361. Pt resting in bed, many family members present in room. Assessing pt and when sheet pulled back, baby noted to be delivered in bed, time 1930. Family members directed to go to waiting room, Dr Bay Álvarez called to bs. Cord clamped and cut by MD, placenta delivered. Baby remained in room under warmer, but mother now requests for him to be taken out. FOB held baby and carried him to room next door. 2133:  Infant brought back into room per parents request.  Bathed, measurements taken, gowned, lock of hair clipped with their verbal permission, foot prints and hand prints done. Mother now holding baby. Family to bedside and pastoral care paged. 2226:  was at bs and offered blessing, now out of room. 2255Mónica Morton, nursing supervisor, notified of delivery. 2346:  Pt assisted to br. Gait steady, no dizziness. Unable to void on commode. Epidural catheter removed, pt now in shower. Soft mattress applied to bed. S/o went out to get food. [facility]:  Assisted back to bed.    0139:  Up to br for second time. Voided. Steady gait, pt may now get up ad iris. Baby remains at bedside. 0415:  Pt resting quietly in bed. S/o with pt. Baby at bedside. Memory box placed in room, pt aware. 1260: Bedside SBAR report to CAT Crawford RN. Care turned over. Baby remains at bedside.

## 2019-02-19 NOTE — PROCEDURES
Delivery Note    Obstetrician:  Shyam Rivas MD    Assistant: none    Pre-Delivery Diagnosis: Term pregnancy IUFD    Post-Delivery Diagnosis: Stillborn infant(s)    Intrapartum Event: None    Procedure: Spontaneous vaginal delivery    Epidural: YES    Monitor:  None    Indications for instrumental delivery: none    Estimated Blood Loss:     Episiotomy: none    Laceration(s):  none    Laceration(s) repair: NO    Presentation: Cephalic    Fetal Description: limon    Fetal Position: Occiput Anterior    Umbilical Cord: Nuchal Cord x  1    Specimens: placenta           Complications:  stillbirth           Cord Blood Results:   Information for the patient's :  Prashant Grimm, Pending B FD [[id number]]   No results found for: PCTABR, PCTDIG, BILI, [address]    Prenatal Labs:     Lab Results   Component Value Date/Time    HBsAg, External Negative 10/03/2018    HIV, External Non Reactive 10/03/2018    Rubella, External non imm 2011    RPR, External Non Reactive 10/03/2018    Gonorrhea, External Negative 10/03/2018    Chlamydia, External Negative 10/03/2018    GrBStrep, External Negative 2019        Attending Attestation: I was present and scrubbed for the entire procedure    Signed By:  Shyam Rivas MD     2019

## 2019-02-19 NOTE — PROGRESS NOTES
Problem: Fetal Demise Care Plan  Goal: *Able to cope  Outcome: Progressing Towards Goal  Appropriate behavior and decision making. Appears that pt has adequate support via significant other (FOB), family. Spent time with and held infant.

## 2019-02-25 NOTE — DISCHARGE SUMMARY
Obstetrical Discharge Summary     Name: Sal Napier MRN: [id number]  SSN: [id number]    YOB: 1995  Age: 24 y.o. Sex: female      Allergies: Patient has no known allergies. Admit Date: 2019    Discharge Date: 2019     Admitting Physician: Miladis Tucker MD     Attending Physician:  No att. providers found     * Admission Diagnoses: IUFD at 25 weeks or more of gestation [O36.4XX0]    * Discharge Diagnoses:   Information for the patient's :  Kirstin Cabrera Pending B FD [[id number]]   Delivery of a 3.65 kg child infant via Vaginal, Spontaneous on 2019 at 7:30 PM  by . Apgars were 0 and 0. Additional Diagnoses:   Hospital Problems as of 2019 Date Reviewed: 2011          Codes Class Noted - Resolved POA    IUFD at 21 weeks or more of gestation ICD-10-CM: O36. 4XX0  ICD-9-CM: [id number]  2019 - Present Yes             Lab Results   Component Value Date/Time    Rubella, External non imm 2011    GrBStrep, External Negative 2019    ABO,Rh A Pos. 10/03/2018      Immunization History   Administered Date(s) Administered    MMR 2019    MMR Vaccine 2011       * Procedures:     * No surgery found *           * Discharge Condition: good    * Hospital Course: Postpartum AFTER STILLBIRTH     * Disposition: Home    Discharge Medications:   Discharge Medication List as of 2019  3:49 AM      CONTINUE these medications which have NOT CHANGED    Details   ferrous sulfate (IRON) 325 mg (65 mg iron) tablet Take  by mouth Daily (before breakfast). , Historical Med      Prenat MV-Iron-Ca-QW-[id number]-DcyNK -1 mg Cmpk Take  by mouth. Historical Med         STOP taking these medications       ibuprofen (MOTRIN) 800 mg tablet Comments:   Reason for Stopping:               * Follow-up Care/Patient Instructions:   Activity: Activity as tolerated  Diet: Regular Diet  Wound Care: None needed    Follow-up Information    None          Signed By:  Heidi Reyna Jesus Jeffery MD     February 25, 2019

## 2020-06-11 ENCOUNTER — DOCUMENTATION ONLY (OUTPATIENT)
Dept: INTERNAL MEDICINE CLINIC | Age: 25
End: 2020-06-11

## 2020-06-23 ENCOUNTER — VIRTUAL VISIT (OUTPATIENT)
Dept: INTERNAL MEDICINE CLINIC | Age: 25
End: 2020-06-23

## 2020-06-23 DIAGNOSIS — R03.0 ELEVATED BLOOD-PRESSURE READING, WITHOUT DIAGNOSIS OF HYPERTENSION: ICD-10-CM

## 2020-06-23 DIAGNOSIS — R07.9 ACUTE NONSPECIFIC CHEST PAIN WITH LOW RISK OF CORONARY ARTERY DISEASE: Primary | ICD-10-CM

## 2020-06-23 DIAGNOSIS — Z91.89 ACUTE NONSPECIFIC CHEST PAIN WITH LOW RISK OF CORONARY ARTERY DISEASE: Primary | ICD-10-CM

## 2020-06-23 NOTE — PROGRESS NOTES
Subjective:     Magalys Arora is a 21 y.o. female who presents for follow up of hypertension. Seen in ER earlier today for CP, resolved, says not cardiac  New concerns: is PP delivered  May 28th, and BP was 282 systolic. Was having CP did tests had neg w/u [facility].  [id number], CP not cardiac releated  No HTN during preg. SOn doing well. No preeclampsia or HTN Dx.  gyn =   No suicidal ideation or depression  Diet and Lifestyle: nonsmoker  Patient is new to this clinic. Comes in to establish care. Previous care was with . Reason for the change is: need PCP  Has 6 week PP with OB    Cardiovascular ROS: no TIA's, no chest pain on exertion, no dyspnea on exertion, no swelling of ankles, no meds No CP currently. Review of Systems, additional:  Pertinent items are noted in HPI. No Known Allergies  Past Medical History:   Diagnosis Date    Anemia     Chlamydia     previously per prenatal. Negative 10/13/18     History reviewed. No pertinent surgical history. Social History     Tobacco Use    Smoking status: Never Smoker    Smokeless tobacco: Never Used   Substance Use Topics    Alcohol use: No                 Objective:     Physical exam significant for the following:     WNL    There were no vitals taken for this visit. Appearance: alert, well appearing, and in no distress. General exam: def. .   Labs from today were reviewed  no, labs done previously were reviewed  yes, Labs done in ER were reviewed  yes, Additional labs are ordered  no,      Assessment/Plan:       ICD-10-CM ICD-9-CM    1. Acute nonspecific chest pain with low risk of coronary artery disease R07.9 786.50     Z91.89 V15.89    2. Elevated blood-pressure reading, without diagnosis of hypertension R03.0 796.2        Call if CP re-occurs. If BP elevated at pp check up callto schedule appt.   F/u PRN    F/u prn

## 2020-06-23 NOTE — PROGRESS NOTES
Chief Complaint   Patient presents with   [facility] Maintenance reviewed. I have reviewed the patient's medical history in detail and updated the computerized patient record. Patient has not been out of the country in (3-4 months) 90 -120 days, NO diarrhea, NO cough, NO chest conjestion, NO temp. Pt has not been around anyone with these symptoms. Encouraged pt to discuss pt's wishes with spouse/partner/family and bring them in the next appt to follow thru with the Advanced Directive    Fall Risk Assessment, last 12 mths 6/23/2020   Able to walk? Yes   Fall in past 12 months? No       3 most recent PHQ Screens 6/23/2020   Little interest or pleasure in doing things Several days   Feeling down, depressed, irritable, or hopeless Several days   Total Score PHQ 2 2       No flowsheet data found.     ADL Assessment 6/23/2020   Feeding yourself No Help Needed   Getting from bed to chair No Help Needed   Getting dressed No Help Needed   Bathing or showering No Help Needed   Walk across the room (includes cane/walker) No Help Needed   Using the telphone No Help Needed   Taking your medications No Help Needed   Preparing meals No Help Needed   Managing money (expenses/bills) No Help Needed   Moderately strenuous housework (laundry) No Help Needed   Shopping for personal items (toiletries/medicines) No Help Needed   Shopping for groceries No Help Needed   Driving No Help Needed   Climbing a flight of stairs No Help Needed   Getting to places beyond walking distances No Help Needed

## 2020-07-15 ENCOUNTER — OFFICE VISIT (OUTPATIENT)
Dept: INTERNAL MEDICINE CLINIC | Age: 25
End: 2020-07-15

## 2020-07-15 VITALS
HEIGHT: 67 IN | RESPIRATION RATE: 16 BRPM | BODY MASS INDEX: 33.43 KG/M2 | OXYGEN SATURATION: 99 % | TEMPERATURE: 98.6 F | DIASTOLIC BLOOD PRESSURE: 80 MMHG | WEIGHT: 213 LBS | SYSTOLIC BLOOD PRESSURE: 112 MMHG | HEART RATE: 90 BPM

## 2020-07-15 DIAGNOSIS — F41.9 ANXIETY: ICD-10-CM

## 2020-07-15 DIAGNOSIS — R00.2 PALPITATIONS: Primary | ICD-10-CM

## 2020-07-15 RX ORDER — ESCITALOPRAM OXALATE 10 MG/1
10 TABLET ORAL DAILY
COMMUNITY
End: 2020-10-21 | Stop reason: SINTOL

## 2020-07-15 NOTE — PROGRESS NOTES
Chief Complaint   Patient presents with    Irregular Heart Beat     x1 month     Health Maintenance reviewed. I have reviewed the patient's medical history in detail and updated the computerized patient record. Patient has not been out of the country in (3-4 months) 90 -120 days, NO diarrhea, NO cough, NO chest conjestion, NO temp. Pt has not been around anyone with these symptoms. 1. Have you been to the ER, urgent care clinic since your last visit? Hospitalized since your last visit?no    2. Have you seen or consulted any other health care providers outside of the [address] [place] [address] since your last visit? Include any pap smears or colon screening. No    Encouraged pt to discuss pt's wishes with spouse/partner/family and bring them in the next appt to follow thru with the Advanced Directive    Fall Risk Assessment, last 12 mths 7/15/2020   Able to walk? Yes   Fall in past 12 months? -       3 most recent PHQ Screens 7/15/2020   Little interest or pleasure in doing things Nearly every day   Feeling down, depressed, irritable, or hopeless Nearly every day   Total Score PHQ 2 6       Abuse Screening Questionnaire 7/15/2020   Do you ever feel afraid of your partner? N   Are you in a relationship with someone who physically or mentally threatens you? N   Is it safe for you to go home?  Y       ADL Assessment 7/15/2020   Feeding yourself No Help Needed   Getting from bed to chair No Help Needed   Getting dressed No Help Needed   Bathing or showering No Help Needed   Walk across the room (includes cane/walker) No Help Needed   Using the telphone No Help Needed   Taking your medications No Help Needed   Preparing meals No Help Needed   Managing money (expenses/bills) No Help Needed   Moderately strenuous housework (laundry) No Help Needed   Shopping for personal items (toiletries/medicines) No Help Needed   Shopping for groceries No Help Needed   Driving No Help Needed   Climbing a flight of stairs No Help Needed   Getting to places beyond walking distances No Help Needed

## 2020-07-18 PROBLEM — F41.9 ANXIETY: Status: ACTIVE | Noted: 2020-07-18

## 2020-07-18 NOTE — PROGRESS NOTES
HISTORY OF PRESENT ILLNESS  Fatmata Atkinson is a 21 y.o. female. Irregular Heart Beat    The history is provided by the patient. This is a new problem. The current episode started more than 1 week ago. The problem has not changed since onset. The problem occurs daily. Pertinent negatives include no fever, no chest pressure, no dizziness and no shortness of breath. Her past medical history does not include hyperthyroidism, heart disease, hypertension or atrial fibrillation. Anxiety   Pertinent negatives include no shortness of breath. Feels somewhat anxious all the time. Recently gave birth to a healthy baby boy, who is 9 weeks old and doing well. Not really complaining of depression per se. Gynecologist put her on  Lexapro but not really started it yet. Labs from today were reviewed  yes, labs done previously were reviewed  yes, Labs done in ER were reviewed  yes, Additional labs are ordered  no,      Review of Systems   Constitutional: Negative for fever. Respiratory: Negative for shortness of breath. Cardiovascular: Positive for palpitations. Neurological: Negative for dizziness.    No domestic violence    Patient Active Problem List   Diagnosis Code    Anxiety F41.9     Social History     Socioeconomic History    Marital status: SINGLE     Spouse name: Not on file    Number of children: Not on file    Years of education: Not on file    Highest education level: Not on file   Occupational History    Occupation: nursing   Social Needs    Financial resource strain: Not on file    Food insecurity     Worry: Not on file     Inability: Not on file   [employer] needs     Medical: Not on file     Non-medical: Not on file   Tobacco Use    Smoking status: Never Smoker    Smokeless tobacco: Never Used   Substance and Sexual Activity    Alcohol use: No    Drug use: No    Sexual activity: Yes     Partners: Male     Birth control/protection: None   Lifestyle    Physical activity     Days per week: Not on file     Minutes per session: Not on file    Stress: Not on file   Relationships    Social connections     Talks on phone: Not on file     Gets together: Not on file     Attends Gnosticism service: Not on file     Active member of club or organization: Not on file     Attends meetings of clubs or organizations: Not on file     Relationship status: Not on file    Intimate partner violence     Fear of current or ex partner: Not on file     Emotionally abused: Not on file     Physically abused: Not on file     Forced sexual activity: Not on file   Other Topics Concern     Service Not Asked    Blood Transfusions Not Asked    Caffeine Concern Not Asked    Occupational Exposure Not Asked    Hobby Hazards Not Asked    Sleep Concern Not Asked    Stress Concern Not Asked    Weight Concern Not Asked    Special Diet Not Asked    Back Care Not Asked    Exercise Not Asked    Bike Helmet Not Asked    Seat Belt Not Asked    Self-Exams Not Asked   Social History Narrative    Lives with SO and 2 kids       Physical Exam  Visit Vitals  /80   Pulse 90   Temp 98.6 °F (37 °C) (Oral)   Resp 16   Ht 5' 7\" (1.702 m)   Wt 213 lb (96.6 kg)   SpO2 99%   BMI 33.36 kg/m²     WD WN female NAD  Heart RRR without murmers clicks or rubs  Lungs CTA  Abdo soft nontender  Ext no edema  EKG unremarkable normal sinus rhythm  ASSESSMENT and PLAN  Encounter Diagnoses   Name Primary?  Palpitations Yes    Anxiety      Orders Placed This Encounter    AMB POC EKG ROUTINE W/ 12 LEADS, INTER & REP    escitalopram oxalate (Lexapro) 10 mg tablet     Suspect palpitations from anxiety, agree with starting Lexapro per gynecology  Discussed possible side affects, precautions, and drug interactions and possible benefits of the medication(s).     Follow-up 2 months

## 2020-07-20 ENCOUNTER — TELEPHONE (OUTPATIENT)
Dept: INTERNAL MEDICINE CLINIC | Age: 25
End: 2020-07-20

## 2020-07-20 DIAGNOSIS — R00.2 PALPITATIONS: Primary | ICD-10-CM

## 2020-07-20 NOTE — TELEPHONE ENCOUNTER
Patient says heart palpitations are still happening (seen on July 15 for this reason). She would like to speak to a nurse about getting blood work done.  Patient can be reached at [phone]

## 2020-07-24 LAB
BUN SERPL-MCNC: 14 MG/DL (ref 6–20)
BUN/CREAT SERPL: 19 (ref 9–23)
CALCIUM SERPL-MCNC: 9.6 MG/DL (ref 8.7–10.2)
CHLORIDE SERPL-SCNC: 101 MMOL/L (ref 96–106)
CO2 SERPL-SCNC: 24 MMOL/L (ref 20–29)
CREAT SERPL-MCNC: 0.75 MG/DL (ref 0.57–1)
ERYTHROCYTE [DISTWIDTH] IN BLOOD BY AUTOMATED COUNT: 13 % (ref 11.7–15.4)
GLUCOSE SERPL-MCNC: 75 MG/DL (ref 65–99)
HCT VFR BLD AUTO: 36.4 % (ref 34–46.6)
HGB BLD-MCNC: 12 G/DL (ref 11.1–15.9)
MCH RBC QN AUTO: 26.4 PG (ref 26.6–33)
MCHC RBC AUTO-ENTMCNC: 33 G/DL (ref 31.5–35.7)
MCV RBC AUTO: 80 FL (ref 79–97)
PLATELET # BLD AUTO: 310 X10E3/UL (ref 150–450)
POTASSIUM SERPL-SCNC: 4.5 MMOL/L (ref 3.5–5.2)
RBC # BLD AUTO: 4.55 X10E6/UL (ref 3.77–5.28)
SODIUM SERPL-SCNC: 139 MMOL/L (ref 134–144)
TSH SERPL DL<=0.005 MIU/L-ACNC: 0.61 UIU/ML (ref 0.45–4.5)
WBC # BLD AUTO: 5.6 X10E3/UL (ref 3.4–10.8)

## 2020-08-06 ENCOUNTER — TELEPHONE (OUTPATIENT)
Dept: INTERNAL MEDICINE CLINIC | Age: 25
End: 2020-08-06

## 2020-08-06 NOTE — TELEPHONE ENCOUNTER
Pt would like to speak with the doctor about her heart rate. She said that she went to the ER. Please call her at [phone].

## 2020-08-06 NOTE — TELEPHONE ENCOUNTER
TOÑO:  Called and spoke with patient - she states that at around 11pm last night she was eating chinese takeout in her bed and when she sat up she could feel her heart beating really fast - nearing 130 bpm - denies chest pain, denies nausea, she got scared and called 911 - she was taken to the [facility] ER where they observed her for a while and gave her IV fluids and her heart rate made it back down to the 80s - /67 upon leaving the ER - patient has not been taking the Lexapro that was prescribed to her by her gynecologist because she didn't believe that she had anxiety, but she did start taking it this morning - advised that it sounds like a panic attack, it will sometimes take up to 2-3 weeks to feel the full effect of those type of medications, in the meantime she should try activities that calm her, whether reading, listening to music, whatever makes her feel calm - informed patient that I will make Dr Virgilio Mendoza aware of this and if there are any changes we will call her  Triston Kraft LPN  5/0/7906  4:38 PM

## 2020-09-10 ENCOUNTER — TELEPHONE (OUTPATIENT)
Dept: INTERNAL MEDICINE CLINIC | Age: 25
End: 2020-09-10

## 2020-09-10 DIAGNOSIS — R00.2 PALPITATIONS: Primary | ICD-10-CM

## 2020-09-10 RX ORDER — LABETALOL 100 MG/1
100 TABLET, FILM COATED ORAL DAILY
Qty: 30 TAB | Refills: 2 | Status: SHIPPED | OUTPATIENT
Start: 2020-09-10 | End: 2021-01-06 | Stop reason: SDUPTHER

## 2020-09-10 NOTE — TELEPHONE ENCOUNTER
From [facility] Front Office Pool               Caller's first and last name: Jose Alejandro Haywood   Reason for call: Requested a call back   Callback required yes/no and why: yes   Best contact number(s): [phone]   Details to clarify the request: Pt stated that she still has a high heart rate with standing and would like to know if she could be prescribe a beta blocker and get a referral to see a cardiologist.

## 2020-10-21 ENCOUNTER — VIRTUAL VISIT (OUTPATIENT)
Dept: INTERNAL MEDICINE CLINIC | Age: 25
End: 2020-10-21
Payer: COMMERCIAL

## 2020-10-21 DIAGNOSIS — F41.9 ANXIETY: Primary | ICD-10-CM

## 2020-10-21 DIAGNOSIS — R00.2 PALPITATIONS: ICD-10-CM

## 2020-10-21 DIAGNOSIS — K62.5 BRBPR (BRIGHT RED BLOOD PER RECTUM): ICD-10-CM

## 2020-10-21 PROCEDURE — 99214 OFFICE O/P EST MOD 30 MIN: CPT | Performed by: FAMILY MEDICINE

## 2020-10-21 RX ORDER — SERTRALINE HYDROCHLORIDE 50 MG/1
50 TABLET, FILM COATED ORAL DAILY
Qty: 30 TAB | Refills: 5 | Status: SHIPPED | OUTPATIENT
Start: 2020-10-21 | End: 2021-01-06 | Stop reason: SDUPTHER

## 2020-10-21 NOTE — PROGRESS NOTES
HISTORY OF PRESENT ILLNESS  Yolette Butcher is a 31 y.o. female. Allergic Reaction   The history is provided by the patient. This is a new problem. The current episode started more than 1 week ago (2 weeks). Progression since onset: some constiopation. Pertinent negatives include no abdominal pain. Associated symptoms comments: Blood in stool BRBPR. She has tried nothing for the symptoms. Allergies   Allergen Reactions    Amoxicillin Nausea Only and Hives       Review of Systems   Constitutional: Negative for fever. Gastrointestinal: Positive for blood in stool and constipation. Negative for abdominal pain. Genitourinary: Negative for dysuria. Skin: Negative for rash. Psychiatric/Behavioral: Negative for depression. The patient is not nervous/anxious.       Social History     Socioeconomic History    Marital status: SINGLE     Spouse name: Not on file    Number of children: Not on file    Years of education: Not on file    Highest education level: Not on file   Occupational History    Occupation: nursing   Social Needs    Financial resource strain: Not on file    Food insecurity     Worry: Not on file     Inability: Not on file    Transportation needs     Medical: Not on file     Non-medical: Not on file   Tobacco Use    Smoking status: Never Smoker    Smokeless tobacco: Never Used   Substance and Sexual Activity    Alcohol use: No    Drug use: No    Sexual activity: Yes     Partners: Male     Birth control/protection: None   Lifestyle    Physical activity     Days per week: Not on file     Minutes per session: Not on file    Stress: Not on file   Relationships    Social connections     Talks on phone: Not on file     Gets together: Not on file     Attends Synagogue service: Not on file     Active member of club or organization: Not on file     Attends meetings of clubs or organizations: Not on file     Relationship status: Not on file    Intimate partner violence     Fear of current or ex partner: Not on file     Emotionally abused: Not on file     Physically abused: Not on file     Forced sexual activity: Not on file   Other Topics Concern     Service Not Asked    Blood Transfusions Not Asked    Caffeine Concern Not Asked    Occupational Exposure Not Asked    Hobby Hazards Not Asked    Sleep Concern Not Asked    Stress Concern Not Asked    Weight Concern Not Asked    Special Diet Not Asked    Back Care Not Asked    Exercise Not Asked    Bike Helmet Not Asked   [address] Not Asked    Self-Exams Not Asked   Social History Narrative    Lives with SO and 2 kids       Physical Exam  Appears to be in no acute distress, grossly 2 through 12 are nonfocal.  Has her child with her  ASSESSMENT and PLAN  Encounter Diagnoses   Name Primary?  Anxiety Yes    Palpitations     BRBPR (bright red blood per rectum)      Orders Placed This Encounter    sertraline (ZOLOFT) 50 mg tablet     Start ducosate or can get miralax for constipatipon, doubt SSRI is causing the BRBPR but can  Stop lexapro  Discussed possible side affects, precautions, and drug interactions and possible benefits of the medication(s). When on new med and BRBPR passes can start beta blocker  Palpitations much improved. abdo pain precautions given. Follow-up and Dispositions    · Return in about 10 weeks (around 12/30/2020). Lucila Jaimes, who was evaluated through a synchronous (real-time) audio-video encounter, and/or her healthcare decision maker, is aware that it is a billable service, with coverage as determined by her insurance carrier. She provided verbal consent to proceed: Yes, and patient identification was verified. It was conducted pursuant to the emergency declaration under the [facility], [address] [place] [address] authority and the [employer] Act. A caregiver was present when appropriate.  Ability to conduct physical exam was limited. I was in the office. The patient was at home.

## 2020-10-21 NOTE — PROGRESS NOTES
Chief Complaint   Patient presents with    Allergic Reaction     stopped lexapro due to blood stools     Health Maintenance reviewed. I have reviewed the patient's medical history in detail and updated the computerized patient record. Patient has not been out of the country in (6-7 months), NO diarrhea, NO cough, NO chest conjestion, NO temp. Pt has not been around anyone with these symptoms. 1. Have you been to the ER, urgent care clinic since your last visit? Hospitalized since your last visit?no    2. Have you seen or consulted any other health care providers outside of the [address] [place] [address] since your last visit? Include any pap smears or colon screening. No      Encouraged pt to discuss pt's wishes with spouse/partner/family and bring them in the next appt to follow thru with the Advanced Directive    Fall Risk Assessment, last 12 mths 10/21/2020   Able to walk? Yes   Fall in past 12 months? No       3 most recent PHQ Screens 10/21/2020   Little interest or pleasure in doing things Several days   Feeling down, depressed, irritable, or hopeless Several days   Total Score PHQ 2 2       Abuse Screening Questionnaire 10/21/2020   Do you ever feel afraid of your partner? N   Are you in a relationship with someone who physically or mentally threatens you? N   Is it safe for you to go home?  Y       ADL Assessment 10/21/2020   Feeding yourself No Help Needed   Getting from bed to chair No Help Needed   Getting dressed No Help Needed   Bathing or showering No Help Needed   Walk across the room (includes cane/walker) No Help Needed   Using the telphone No Help Needed   Taking your medications No Help Needed   Preparing meals No Help Needed   Managing money (expenses/bills) No Help Needed   Moderately strenuous housework (laundry) No Help Needed   Shopping for personal items (toiletries/medicines) No Help Needed   Shopping for groceries No Help Needed   Driving No Help Needed   Climbing a flight of stairs No Help Needed   Getting to places beyond walking distances No Help Needed

## 2020-10-23 ENCOUNTER — TELEPHONE (OUTPATIENT)
Dept: INTERNAL MEDICINE CLINIC | Age: 25
End: 2020-10-23

## 2020-10-23 NOTE — TELEPHONE ENCOUNTER
Pt called in reference to her sertraline. She said the wrong dosage was sent in. It was written for 50 mg but it should have been 10 mg. Please call her at [phone].

## 2021-01-06 ENCOUNTER — VIRTUAL VISIT (OUTPATIENT)
Dept: INTERNAL MEDICINE CLINIC | Age: 26
End: 2021-01-06
Payer: COMMERCIAL

## 2021-01-06 DIAGNOSIS — F41.9 ANXIETY: ICD-10-CM

## 2021-01-06 DIAGNOSIS — R00.2 PALPITATIONS: ICD-10-CM

## 2021-01-06 PROCEDURE — 99213 OFFICE O/P EST LOW 20 MIN: CPT | Performed by: FAMILY MEDICINE

## 2021-01-06 RX ORDER — SERTRALINE HYDROCHLORIDE 50 MG/1
50 TABLET, FILM COATED ORAL DAILY
Qty: 90 TAB | Refills: 3 | Status: SHIPPED | OUTPATIENT
Start: 2021-01-06 | End: 2021-05-13 | Stop reason: SDUPTHER

## 2021-01-06 RX ORDER — LABETALOL 100 MG/1
100 TABLET, FILM COATED ORAL DAILY
Qty: 30 TAB | Refills: 5 | Status: SHIPPED | OUTPATIENT
Start: 2021-01-06 | End: 2021-11-01 | Stop reason: ALTCHOICE

## 2021-01-06 NOTE — PROGRESS NOTES
PROGRESS NOTE        SUBJECTIVE:  Diagnosis/Chief Complaint: Rectal Bleeding    Bleeding resolved  Doing well with mood yes - good  Symptoms anxiety not too bad  Suicidal: no  Side affects: no  States taking medications per medicine list.no  Not taking labetalol, but dec pulse elevation  Not Dx with HTN    Patient Active Problem List    Diagnosis Date Noted    Anxiety 07/18/2020     Current Outpatient Medications   Medication Sig Dispense Refill    sertraline (ZOLOFT) 50 mg tablet Take 1 Tab by mouth daily. 30 Tab 5    labetaloL (NORMODYNE) 100 mg tablet Take 1 Tab by mouth daily. 30 Tab 2     Allergies   Allergen Reactions    Amoxicillin Nausea Only and Hives     Social History     Tobacco Use    Smoking status: Never Smoker    Smokeless tobacco: Never Used   Substance Use Topics    Alcohol use: No        OBJECTIVE:    . There were no vitals taken for this visit. WDWN in NAD    Psychiatric: Normal mood, judgement    Reviewed: Medications, allergies, clinical lab test results and imaging results have been reviewed. Any abnormal findings have been addressed. ASSESSMENT:       ICD-10-CM ICD-9-CM    1. Palpitations  R00.2 785.1 labetaloL (NORMODYNE) 100 mg tablet   2. Anxiety  F41.9 300.00 sertraline (ZOLOFT) 50 mg tablet       PLAN      Orders Placed This Encounter    labetaloL (NORMODYNE) 100 mg tablet     Sig: Take 1 Tab by mouth daily. As needed for palpitations     Dispense:  30 Tab     Refill:  5    sertraline (ZOLOFT) 50 mg tablet     Sig: Take 1 Tab by mouth daily. Dispense:  90 Tab     Refill:  3     Discussed possible side affects, precautions, and drug interactions and possible benefits of the medication(s). Doing well, cont meds beta blocker as needed  Bleeding resolved.   F/up 6 mo

## 2021-01-06 NOTE — LETTER
1/6/2021 8:39 AM 
 
Ms. Janae Rosenberg [address] [facility] [address] Dear Ms. Hebert: 
 
Enclosed you will find your next Virtual Visit appointment on July 6, 2021 @8:30AM. If you have any questions, please do not hesitate to contact this office. Sincerely, Yenny Olivera MD

## 2021-01-06 NOTE — PROGRESS NOTES
Chief Complaint   Patient presents with    Rectal Bleeding     Health Maintenance reviewed. I have reviewed the patient's medical history in detail and updated the computerized patient record. Patient has not been out of the country in (10-11 months), NO diarrhea, NO cough, NO chest conjestion, NO temp. Pt has not been around anyone with these symptoms. 1. Have you been to the ER, urgent care clinic since your last visit? Hospitalized since your last visit?no    2. Have you seen or consulted any other health care providers outside of the [address] [place] [address] since your last visit? Include any pap smears or colon screening. No      Encouraged pt to discuss pt's wishes with spouse/partner/family and bring them in the next appt to follow thru with the Advanced Directive    Fall Risk Assessment, last 12 mths 1/6/2021   Able to walk? Yes   Fall in past 12 months? 0   Do you feel unsteady? 0   Are you worried about falling 0       3 most recent PHQ Screens 1/6/2021   Little interest or pleasure in doing things Several days   Feeling down, depressed, irritable, or hopeless Several days   Total Score PHQ 2 2       Abuse Screening Questionnaire 1/6/2021   Do you ever feel afraid of your partner? N   Are you in a relationship with someone who physically or mentally threatens you? N   Is it safe for you to go home?  Y       ADL Assessment 1/6/2021   Feeding yourself No Help Needed   Getting from bed to chair No Help Needed   Getting dressed No Help Needed   Bathing or showering No Help Needed   Walk across the room (includes cane/walker) No Help Needed   Using the telphone No Help Needed   Taking your medications No Help Needed   Preparing meals No Help Needed   Managing money (expenses/bills) No Help Needed   Moderately strenuous housework (laundry) No Help Needed   Shopping for personal items (toiletries/medicines) No Help Needed   Shopping for groceries No Help Needed   Driving No Help Needed   Climbing a flight of stairs No Help Needed   Getting to places beyond walking distances No Help Needed

## 2021-07-06 ENCOUNTER — VIRTUAL VISIT (OUTPATIENT)
Dept: INTERNAL MEDICINE CLINIC | Age: 26
End: 2021-07-06
Payer: COMMERCIAL

## 2021-07-06 DIAGNOSIS — R00.2 PALPITATIONS: Primary | ICD-10-CM

## 2021-07-06 DIAGNOSIS — F41.9 ANXIETY: ICD-10-CM

## 2021-07-06 PROCEDURE — 99213 OFFICE O/P EST LOW 20 MIN: CPT | Performed by: FAMILY MEDICINE

## 2021-07-06 RX ORDER — SERTRALINE HYDROCHLORIDE 50 MG/1
50 TABLET, FILM COATED ORAL DAILY
Qty: 90 TABLET | Refills: 3 | Status: SHIPPED | OUTPATIENT
Start: 2021-07-06 | End: 2021-12-07 | Stop reason: SDUPTHER

## 2021-07-06 NOTE — PROGRESS NOTES
PROGRESS NOTE        SUBJECTIVE:  Diagnosis/Chief Complaint: Anxiety      Doing well with mood yes - it's doing good  Symptoms works in rehab  Suicidal: no  Side affects: no  States taking medications per medicine list.yes - min usage labetolol    Patient Active Problem List    Diagnosis Date Noted    Palpitations 01/06/2021    Anxiety 07/18/2020     Current Outpatient Medications   Medication Sig Dispense Refill    sertraline (ZOLOFT) 50 mg tablet Take 1 Tab by mouth daily. 90 Tab 1    labetaloL (NORMODYNE) 100 mg tablet Take 1 Tab by mouth daily. As needed for palpitations 30 Tab 5     Allergies   Allergen Reactions    Amoxicillin Nausea Only and Hives     Past Medical History:   Diagnosis Date    Anemia     Chlamydia     previously per prenatal. Negative 10/13/18     Social History     Tobacco Use    Smoking status: Never Smoker    Smokeless tobacco: Never Used   Substance Use Topics    Alcohol use: No        OBJECTIVE:    . There were no vitals taken for this visit. WDWN in NAD  Heart RRR, no:C/M/R  Lungs CTA No wheezes, rales or rhonchi  Abdo: soft no tenderness, rebound or guarding  Neurological exam[de-identified] 2-12 intact  Psychiatric: Normal mood, judgement    Reviewed: Medications, allergies, clinical lab test results and imaging results have been reviewed. Any abnormal findings have been addressed. ASSESSMENT:       ICD-10-CM ICD-9-CM    1. Palpitations  R00.2 785.1    2. Anxiety  F41.9 300.00 sertraline (ZOLOFT) 50 mg tablet       PLAN    Orders Placed This Encounter    sertraline (ZOLOFT) 50 mg tablet     Sig: Take 1 Tablet by mouth daily. Dispense:  90 Tablet     Refill:  3     COnt discussed weaning but 4 now cont. F/up 6 mo    Janicemauro Bailey, who was evaluated through a synchronous (real-time) audio-video encounter, and/or her healthcare decision maker, is aware that it is a billable service, with coverage as determined by her insurance carrier.  She provided verbal consent to proceed: Yes, and patient identification was verified. This visit was conducted pursuant to the emergency declaration under the [address] [place] [address] and the [employer] and [employer] Act. A caregiver was present when appropriate. Ability to conduct physical exam was limited. The patient was located in a state where the provider was credentialed to provide care.      --Tcao Bowen MD on 7/6/2021 at 8:53 AM

## 2021-07-06 NOTE — PROGRESS NOTES
Chief Complaint   Patient presents with    Anxiety     Patient has not been out of the country in (14 months), NO diarrhea, NO cough, NO chest conjestion, NO temp. Pt has not been around anyone with these symptoms. Health Maintenance reviewed. I have reviewed the patient's medical history in detail and updated the computerized patient record. 1. Have you been to the ER, urgent care clinic since your last visit? Yes  Hospitalized since your last visit? Yes      2. Have you seen or consulted any other health care providers outside of the [address] [place] [address] since your last visit? Yes  Include any pap smears or colon screening. Encouraged pt to discuss pt's wishes with spouse/partner/family and bring them in the next appt to follow thru with the Advanced Directive    @  [address] [facility] [address], last 12 mths 1/6/2021   Able to walk? Yes   Fall in past 12 months? 0   Do you feel unsteady? 0   Are you worried about falling 0       3 most recent PHQ Screens 7/6/2021   Little interest or pleasure in doing things Several days   Feeling down, depressed, irritable, or hopeless Several days   Total Score PHQ 2 2       Abuse Screening Questionnaire 7/6/2021   Do you ever feel afraid of your partner? N   Are you in a relationship with someone who physically or mentally threatens you? N   Is it safe for you to go home?  Y       ADL Assessment 7/6/2021   Feeding yourself No Help Needed   Getting from bed to chair No Help Needed   Getting dressed No Help Needed   Bathing or showering No Help Needed   Walk across the room (includes cane/walker) No Help Needed   Using the telphone No Help Needed   Taking your medications No Help Needed   Preparing meals No Help Needed   Managing money (expenses/bills) No Help Needed   Moderately strenuous housework (laundry) No Help Needed   Shopping for personal items (toiletries/medicines) No Help Needed   Shopping for groceries No Help Needed   Driving No Help Needed   Climbing a flight of stairs No Help Needed   Getting to places beyond walking distances No Help Needed

## 2021-11-01 ENCOUNTER — VIRTUAL VISIT (OUTPATIENT)
Dept: INTERNAL MEDICINE CLINIC | Age: 26
End: 2021-11-01
Payer: COMMERCIAL

## 2021-11-01 DIAGNOSIS — F33.0 DEPRESSION, MAJOR, RECURRENT, MILD (HCC): ICD-10-CM

## 2021-11-01 DIAGNOSIS — R10.11 RUQ ABDOMINAL PAIN: Primary | ICD-10-CM

## 2021-11-01 PROCEDURE — 99214 OFFICE O/P EST MOD 30 MIN: CPT | Performed by: FAMILY MEDICINE

## 2021-11-01 NOTE — PROGRESS NOTES
Chief Complaint   Patient presents with    Abdominal Pain     R/side abd pain after eating and last 45min to 1 hour afterwards     Patient is aware that this is a Virtual Visit or Phone Call Only doctor's visit. Patient has not been out of the country in (14 months), NO diarrhea, NO cough, NO chest conjestion, NO temp. Pt has not been around anyone with these symptoms. Health Maintenance reviewed. I have reviewed the patient's medical history in detail and updated the computerized patient record. 1. Have you been to the ER, urgent care clinic since your last visit? No  Hospitalized since your last visit? No      2. Have you seen or consulted any other health care providers outside of the [address] [place] [address] since your last visit? No  Include any pap smears or colon screening. Encouraged pt to discuss pt's wishes with spouse/partner/family and bring them in the next appt to follow thru with the Advanced Directive      Fall Risk Assessment, last 12 mths 1/6/2021   Able to walk? Yes   Fall in past 12 months? 0   Do you feel unsteady? 0   Are you worried about falling 0       3 most recent PHQ Screens 11/1/2021   Little interest or pleasure in doing things Several days   Feeling down, depressed, irritable, or hopeless Several days   Total Score PHQ 2 2       Abuse Screening Questionnaire 11/1/2021   Do you ever feel afraid of your partner? N   Are you in a relationship with someone who physically or mentally threatens you? N   Is it safe for you to go home?  Y       ADL Assessment 11/1/2021   Feeding yourself No Help Needed   Getting from bed to chair No Help Needed   Getting dressed No Help Needed   Bathing or showering No Help Needed   Walk across the room (includes cane/walker) No Help Needed   Using the telphone No Help Needed   Taking your medications No Help Needed   Preparing meals No Help Needed   Managing money (expenses/bills) No Help Needed   Moderately strenuous housework (laundry) No Help Needed   Shopping for personal items (toiletries/medicines) No Help Needed   Shopping for groceries No Help Needed   Driving No Help Needed   Climbing a flight of stairs No Help Needed   Getting to places beyond walking distances No Help Needed

## 2021-11-02 NOTE — PROGRESS NOTES
PROGRESS NOTE        SUBJECTIVE:  Diagnosis/Chief Complaint: Abdominal Pain (R/side abd pain after eating and last 45min to 1 hour afterwards)      Doing well with mood yes - good  Symptoms RUQ abdo pain freq after eating, sister had similar pain had GS, needed GB removed after pancreatitis. Worse after heavy meals. Sx x few mo. Getting worse/stable, none now. Suicidal: no  Side affects: no  States taking medications per medicine list.yes - as RX    Patient Active Problem List    Diagnosis Date Noted    Palpitations 01/06/2021    Anxiety 07/18/2020     Allergies   Allergen Reactions    Amoxicillin Nausea Only and Hives     Past Medical History:   Diagnosis Date    Anemia     Chlamydia     previously per prenatal. Negative 10/13/18     No past surgical history on file. Social History     Tobacco Use    Smoking status: Never Smoker    Smokeless tobacco: Never Used   Substance Use Topics    Alcohol use: No        OBJECTIVE:    . There were no vitals taken for this visit. WDWN in NAD  Neurological exam[de-identified] 2-12 intact  Psychiatric: Normal mood, judgement    Reviewed: Medications, allergies, clinical lab test results and imaging results have been reviewed. Any abnormal findings have been addressed. ASSESSMENT:       ICD-10-CM ICD-9-CM    1. RUQ abdominal pain  R10.11 789.01 US ABD COMP      METABOLIC PANEL, COMPREHENSIVE      CBC WITH AUTOMATED DIFF      LIPASE   2. Depression, major, recurrent, mild ([facility] [address]  F33.0 296.31        PLAN    Orders Placed This Encounter    US ABD COMP     Standing Status:   Future     Standing Expiration Date:   12/1/2022     Order Specific Question:   Is Patient Pregnant?      Answer:   No     Order Specific Question:   Reason for Exam     Answer:   RUQ abdo pain    METABOLIC PANEL, COMPREHENSIVE     Standing Status:   Future     Standing Expiration Date:   5/4/2022    CBC WITH AUTOMATED DIFF     Standing Status:   Future     Standing Expiration Date:   5/1/2022    LIPASE Standing Status:   Future     Standing Expiration Date:   5/1/2022     abdo precautions given  eval as above  Depression stable    F/u few weeks after above studies.

## 2021-12-07 DIAGNOSIS — F41.9 ANXIETY: ICD-10-CM

## 2021-12-07 NOTE — TELEPHONE ENCOUNTER
Pharmacy refill request      Requested Prescriptions     Pending Prescriptions Disp Refills    sertraline (ZOLOFT) 50 mg tablet 90 Tablet 3     Sig: Take 1 Tablet by mouth daily.

## 2021-12-08 RX ORDER — SERTRALINE HYDROCHLORIDE 50 MG/1
50 TABLET, FILM COATED ORAL DAILY
Qty: 90 TABLET | Refills: 3 | Status: SHIPPED | OUTPATIENT
Start: 2021-12-08 | End: 2022-10-18 | Stop reason: ALTCHOICE

## 2022-09-27 ENCOUNTER — TELEPHONE (OUTPATIENT)
Dept: INTERNAL MEDICINE CLINIC | Age: 27
End: 2022-09-27

## 2022-09-27 NOTE — TELEPHONE ENCOUNTER
FYI - Pt called with R/side abd pain off and on, no N/V, no temp, pain 3/10, pt wanted a new order for an US of abd and lab work ordered. Last ordered and not done in 2021. Pt at work and refused an office visit or a VV. After discussion pt is going to the Urgent Care today.

## 2022-10-18 ENCOUNTER — OFFICE VISIT (OUTPATIENT)
Dept: INTERNAL MEDICINE CLINIC | Age: 27
End: 2022-10-18
Payer: COMMERCIAL

## 2022-10-18 VITALS
RESPIRATION RATE: 16 BRPM | HEART RATE: 88 BPM | OXYGEN SATURATION: 98 % | HEIGHT: 67 IN | WEIGHT: 218 LBS | BODY MASS INDEX: 34.21 KG/M2 | DIASTOLIC BLOOD PRESSURE: 60 MMHG | TEMPERATURE: 98.4 F | SYSTOLIC BLOOD PRESSURE: 118 MMHG

## 2022-10-18 DIAGNOSIS — F41.9 ANXIETY: ICD-10-CM

## 2022-10-18 DIAGNOSIS — R10.11 RUQ ABDOMINAL PAIN: Primary | ICD-10-CM

## 2022-10-18 PROCEDURE — 99214 OFFICE O/P EST MOD 30 MIN: CPT | Performed by: FAMILY MEDICINE

## 2022-10-18 NOTE — PROGRESS NOTES
Chief Complaint   Patient presents with    Abdominal Pain     ER FU - gallbladder, need referral to surgeon     Patient has not been out of the country in (14 months), NO diarrhea, NO cough, NO chest conjestion, NO temp. Pt has not been around anyone with these symptoms. Health Maintenance reviewed. I have reviewed the patient's medical history in detail and updated the computerized patient record. 1. Have you been to the ER, urgent care clinic since your last visit? Yes  Hospitalized since your last visit?  no    2. Have you seen or consulted any other health care providers outside of the [address] [place] [address] since your last visit? no Include any pap smears or colon screening. Encouraged pt to discuss pt's wishes with spouse/partner/family and bring them in the next appt to follow thru with the Advanced Directive    @  [address] [facility] [address], last 12 mths 1/6/2021   Able to walk? Yes   Fall in past 12 months? 0   Do you feel unsteady? 0   Are you worried about falling 0       3 most recent PHQ Screens 11/1/2021   Little interest or pleasure in doing things Several days   Feeling down, depressed, irritable, or hopeless Several days   Total Score PHQ 2 2       Abuse Screening Questionnaire 11/1/2021   Do you ever feel afraid of your partner? N   Are you in a relationship with someone who physically or mentally threatens you? N   Is it safe for you to go home?  Y       ADL Assessment 11/1/2021   Feeding yourself No Help Needed   Getting from bed to chair No Help Needed   Getting dressed No Help Needed   Bathing or showering No Help Needed   Walk across the room (includes cane/walker) No Help Needed   Using the telphone No Help Needed   Taking your medications No Help Needed   Preparing meals No Help Needed   Managing money (expenses/bills) No Help Needed   Moderately strenuous housework (laundry) No Help Needed   Shopping for personal items (toiletries/medicines) No Help Needed   Shopping for groceries No Help Needed   Driving No Help Needed   Climbing a flight of stairs No Help Needed   Getting to places beyond walking distances No Help Needed

## 2022-10-21 NOTE — PROGRESS NOTES
All Robertson (: 1995) is a 28 y.o. female, established patient, here for evaluation of the following chief complaint(s):  Abdominal Pain (ER FU - gallbladder, need referral to surgeon)       ASSESSMENT/PLAN:  Below is the assessment and plan developed based on review of pertinent history, physical exam, labs, studies, and medications. ICD-10-CM ICD-9-CM    1. RUQ abdominal pain  R10.11 789.01 NM HEPATOBILIARY DUCT SCAN      2. Anxiety  F41.9 300.00           Consider acalculous cholecystitis, do the above scan  Follow-up after  Abdominal pain precautions given  Anxiety not too bad she stopped the Zoloft. Return in about 4 weeks (around 11/15/2022). SUBJECTIVE/OBJECTIVE:  Abdominal Pain  Associated symptoms include abdominal pain. Again having issues with abdominal pain, went to the emergency room. They did an ultrasound reportedly did not find gallstones. Advised to go see gastroenterology. Seen for this last month did not do the ultrasound then but was done in the ER. Pain was right upper quadrant rating around to the back, epigastric as well still some tenderness not as severe. Review of Systems   Constitutional:  Negative for fever. Gastrointestinal:  Positive for abdominal pain and nausea. Negative for blood in stool and diarrhea. History reviewed. No pertinent surgical history.   No medications    Social History     Socioeconomic History    Marital status: SINGLE     Spouse name: Not on file    Number of children: Not on file    Years of education: Not on file    Highest education level: Not on file   Occupational History    Occupation: nursing   Tobacco Use    Smoking status: Never    Smokeless tobacco: Never   Substance and Sexual Activity    Alcohol use: No    Drug use: No    Sexual activity: Yes     Partners: Male     Birth control/protection: None   Other Topics Concern     Service Not Asked    Blood Transfusions Not Asked    Caffeine Concern Not Asked Occupational Exposure Not Asked    [address] Hazards Not Asked    Sleep Concern Not Asked    Stress Concern Not Asked    Weight Concern Not Asked    Special Diet Not Asked    Back Care Not Asked    Exercise Not Asked    Bike Helmet Not Asked    Seat Belt Not Asked    Self-Exams Not Asked   Social History Narrative    Lives with SO and 2 kids    Works nursing     Social Determinants of Health     Financial Resource Strain: Not on file   Food Insecurity: Not on file   Transportation Needs: Not on file   Physical Activity: Not on file   Stress: Not on file   Social Connections: Not on file   Intimate Partner Violence: Not on file   Housing Stability: Not on file       Physical Exam    Visit Vitals  /60 (BP 1 Location: Left arm, BP Patient Position: Sitting, BP Cuff Size: Adult)   Pulse 88   Temp 98.4 °F (36.9 °C) (Temporal)   Resp 16   Ht 5' 7\" (1.702 m)   Wt 218 lb (98.9 kg)   SpO2 98%   BMI 34.14 kg/m²     WD WN female NAD  Heart RRR without murmers clicks or rubs  Lungs CTA  Abdo soft nontender no guarding no rebound bowel sounds present  Ext no edema      An electronic signature was used to authenticate this note.   -- Mai Sylvester MD

## 2023-03-10 NOTE — TELEPHONE ENCOUNTER
OK start labetolol and referral to cards.   Suggest she schedule an appt to see me in a few weeks negative...